# Patient Record
Sex: MALE | Race: WHITE | NOT HISPANIC OR LATINO | Employment: FULL TIME | URBAN - METROPOLITAN AREA
[De-identification: names, ages, dates, MRNs, and addresses within clinical notes are randomized per-mention and may not be internally consistent; named-entity substitution may affect disease eponyms.]

---

## 2024-02-05 ENCOUNTER — OFFICE VISIT (OUTPATIENT)
Dept: UROLOGY | Facility: CLINIC | Age: 39
End: 2024-02-05
Payer: COMMERCIAL

## 2024-02-05 VITALS — TEMPERATURE: 97.3 F | SYSTOLIC BLOOD PRESSURE: 118 MMHG | DIASTOLIC BLOOD PRESSURE: 74 MMHG | HEART RATE: 56 BPM

## 2024-02-05 DIAGNOSIS — Z30.09 VASECTOMY EVALUATION: Primary | ICD-10-CM

## 2024-02-05 PROCEDURE — 1036F TOBACCO NON-USER: CPT | Performed by: UROLOGY

## 2024-02-05 PROCEDURE — 99203 OFFICE O/P NEW LOW 30 MIN: CPT | Performed by: UROLOGY

## 2024-03-15 ENCOUNTER — APPOINTMENT (OUTPATIENT)
Dept: UROLOGY | Facility: CLINIC | Age: 39
End: 2024-03-15
Payer: COMMERCIAL

## 2024-05-10 ENCOUNTER — PROCEDURE VISIT (OUTPATIENT)
Dept: UROLOGY | Facility: CLINIC | Age: 39
End: 2024-05-10
Payer: COMMERCIAL

## 2024-05-10 VITALS
BODY MASS INDEX: 24.62 KG/M2 | SYSTOLIC BLOOD PRESSURE: 111 MMHG | HEART RATE: 63 BPM | DIASTOLIC BLOOD PRESSURE: 74 MMHG | HEIGHT: 70 IN | WEIGHT: 172 LBS

## 2024-05-10 DIAGNOSIS — Z30.2 ENCOUNTER FOR VASECTOMY: ICD-10-CM

## 2024-05-10 PROCEDURE — 55250 REMOVAL OF SPERM DUCT(S): CPT | Performed by: UROLOGY

## 2024-05-10 NOTE — PROGRESS NOTES
PRIOR NOTES  Healthy 38-year-old male previously seen and counseled by me on vasectomy who is here for his procedure    UPDATED SUBJECTIVE HISTORY  05/10/24 -no recent change in his health    Past Medical History  He has no past medical history on file.    Surgical History  He has no past surgical history on file.     Social History  He reports that he has never smoked. He has never used smokeless tobacco. He reports that he does not drink alcohol and does not use drugs.    Family History  No family history on file.     Allergies  Patient has no known allergies.    ROS: 12 system review was completed and is negative with the exception of those signs and symptoms noted in the history of present illness: A 12 system review was completed and is negative with the exception of those signs and symptoms noted in the history of present illness.     Exam:  General: in NAD, appears stated age  Head: normocephalic, atraumatic  Respiratory: normal effort, no use of accessory muscles  Cardiovascular: no edema noted  Skin: normal turgor, no rashes  Neurologic: grossly intact, oriented to person/place/time  Psychiatric: mode and affect appropriate    Patient ID: Jimmy Caraballo is a 38 y.o. male.    Vasectomy    Date/Time: 5/10/2024 3:38 PM    Performed by: Jimmy Naylor MD  Authorized by: Jimmy Naylor MD    Additional Details:      Informed consent was obtained    Patient was placed in the supine position. His genitalia were prepped and draped in the usual sterile fashion. A 5 mL of 1% lidocaine were used to anesthetize a small area in the right hemiscrotum. A small stab incision was made and the vas deferens was identified. The vas deferens was then delivered through the incision. A 1-2 cm segment was isolated and removed. The ends were tied with 4-0 silk suture and the lumen cauterized. There was good hemostasis. The 2 ends were placed back in the right hemiscrotum and the skin was closed with a single interrupted 3-0 chromic  "stitch. The exact same procedure was repeated on the left side. The patient tolerated the procedure well.           Last Recorded Vitals  Blood pressure 111/74, pulse 63, height 1.778 m (5' 10\"), weight 78 kg (172 lb).    No results found for: \"PSASCREEN\", \"PSAR\", \"KSCOR\", \"CREATININE\", \"HGB\"      ASSESSMENT/PLAN:  38-year-old male s/p uncomplicated vasectomy  - semen analysis 2 mo  - advised to take it easy over 2-3 days post-procedurally  - no intercourse or heavy lifting x 1 week  - ice, tylenol, ibuprofen prn  - 10-20 ejaculations between now and semen test  - contraception until he hears from us that he is sterile    Jimmy Naylor MD    "

## 2024-07-31 ENCOUNTER — APPOINTMENT (OUTPATIENT)
Dept: PRIMARY CARE | Facility: CLINIC | Age: 39
End: 2024-07-31
Payer: COMMERCIAL

## 2024-07-31 VITALS
DIASTOLIC BLOOD PRESSURE: 74 MMHG | SYSTOLIC BLOOD PRESSURE: 114 MMHG | OXYGEN SATURATION: 98 % | RESPIRATION RATE: 16 BRPM | TEMPERATURE: 98.6 F | HEIGHT: 70 IN | HEART RATE: 64 BPM | WEIGHT: 177 LBS | BODY MASS INDEX: 25.34 KG/M2

## 2024-07-31 DIAGNOSIS — R61 HYPERHIDROSIS: ICD-10-CM

## 2024-07-31 DIAGNOSIS — Z00.00 HEALTHCARE MAINTENANCE: Primary | ICD-10-CM

## 2024-07-31 DIAGNOSIS — F17.290 OTHER TOBACCO PRODUCT NICOTINE DEPENDENCE, UNCOMPLICATED: ICD-10-CM

## 2024-07-31 LAB
POC APPEARANCE, URINE: CLEAR
POC BILIRUBIN, URINE: NEGATIVE
POC BLOOD, URINE: NEGATIVE
POC COLOR, URINE: YELLOW
POC GLUCOSE, URINE: NEGATIVE MG/DL
POC KETONES, URINE: NEGATIVE MG/DL
POC LEUKOCYTES, URINE: NEGATIVE
POC NITRITE,URINE: NEGATIVE
POC PH, URINE: 5 PH
POC PROTEIN, URINE: NEGATIVE MG/DL
POC SPECIFIC GRAVITY, URINE: 1.02
POC UROBILINOGEN, URINE: 0.2 EU/DL

## 2024-07-31 PROCEDURE — 90471 IMMUNIZATION ADMIN: CPT | Performed by: FAMILY MEDICINE

## 2024-07-31 PROCEDURE — 90715 TDAP VACCINE 7 YRS/> IM: CPT | Performed by: FAMILY MEDICINE

## 2024-07-31 PROCEDURE — 99385 PREV VISIT NEW AGE 18-39: CPT | Performed by: FAMILY MEDICINE

## 2024-07-31 PROCEDURE — 81002 URINALYSIS NONAUTO W/O SCOPE: CPT | Performed by: FAMILY MEDICINE

## 2024-07-31 PROCEDURE — 3008F BODY MASS INDEX DOCD: CPT | Performed by: FAMILY MEDICINE

## 2024-07-31 RX ORDER — ALUMINUM CHLORIDE 20 %
SOLUTION, NON-ORAL TOPICAL NIGHTLY
Qty: 35 ML | Refills: 0 | Status: SHIPPED | OUTPATIENT
Start: 2024-07-31

## 2024-07-31 ASSESSMENT — ENCOUNTER SYMPTOMS
SHORTNESS OF BREATH: 0
HEADACHES: 0

## 2024-07-31 ASSESSMENT — PATIENT HEALTH QUESTIONNAIRE - PHQ9
2. FEELING DOWN, DEPRESSED OR HOPELESS: NOT AT ALL
SUM OF ALL RESPONSES TO PHQ9 QUESTIONS 1 AND 2: 0
1. LITTLE INTEREST OR PLEASURE IN DOING THINGS: NOT AT ALL

## 2024-07-31 NOTE — PROGRESS NOTES
"Subjective     Jimmy Caraballo is a 39 y.o. male who presents for Annual Exam (Establish care ).    HPI     Pt is new to me.  He is here to establish care and get his annual well exam. He reports increased sweating in hands and feet.  He has tried an OTC topical treatment which has not helped.     Review of Systems   Respiratory:  Negative for shortness of breath.    Cardiovascular:  Negative for chest pain.   Neurological:  Negative for headaches.       Objective     Vitals:    24 1346   BP: 114/74   Pulse: 64   Resp: 16   Temp: 37 °C (98.6 °F)   SpO2: 98%   Weight: 80.3 kg (177 lb)   Height: 1.778 m (5' 10\")        Current Outpatient Medications   Medication Instructions    aluminum chloride (Drysol) 20 % external solution Topical, Nightly        No Known Allergies     Past Surgical History:   Procedure Laterality Date    VASECTOMY  05/10/24        Social History     Tobacco Use    Smoking status: Former     Current packs/day: 0.00     Types: Cigarettes     Quit date: 2024     Years since quittin.1    Smokeless tobacco: Never    Tobacco comments:     Smoked on and off for many years. Currently vaping   Vaping Use    Vaping status: Every Day   Substance Use Topics    Alcohol use: Yes     Alcohol/week: 1.0 - 6.0 standard drink of alcohol     Types: 1 - 3 Cans of beer per week     Comment: Currently trying to quit    Drug use: Never        Social History     Substance and Sexual Activity   Alcohol Use Yes    Alcohol/week: 1.0 - 6.0 standard drink of alcohol    Types: 1 - 3 Cans of beer per week    Comment: Currently trying to quit       Family History   Problem Relation Name Age of Onset    No Known Problems Mother      Alcohol abuse Father Geoff caraballo     Alcohol abuse Sister Brigette caraballo     Cancer Brother Ramu caraballo         Immunization History   Administered Date(s) Administered    Hepatitis B vaccine, 19 yrs and under (RECOMBIVAX, ENGERIX) 10/29/1998    MMR vaccine, subcutaneous (MMR II) " 10/29/1998    Pfizer Purple Cap SARS-CoV-2 04/03/2021, 04/24/2021    Tdap vaccine, age 7 year and older (BOOSTRIX, ADACEL) 07/31/2024        Physical Exam  Vitals reviewed.   Constitutional:       General: He is not in acute distress.     Appearance: Normal appearance.   HENT:      Head: Normocephalic and atraumatic.      Right Ear: Tympanic membrane, ear canal and external ear normal.      Left Ear: Tympanic membrane, ear canal and external ear normal.      Nose: Nose normal.      Mouth/Throat:      Mouth: Mucous membranes are moist.      Pharynx: Oropharynx is clear. No oropharyngeal exudate or posterior oropharyngeal erythema.   Eyes:      Extraocular Movements: Extraocular movements intact.      Pupils: Pupils are equal, round, and reactive to light.   Neck:      Thyroid: No thyroid mass or thyromegaly.   Cardiovascular:      Rate and Rhythm: Normal rate and regular rhythm.      Heart sounds: No murmur heard.  Pulmonary:      Effort: Pulmonary effort is normal. No respiratory distress.      Breath sounds: Normal breath sounds. No wheezing, rhonchi or rales.   Abdominal:      General: Abdomen is flat. There is no distension.      Palpations: Abdomen is soft.      Tenderness: There is no abdominal tenderness.   Genitourinary:     Testes: Normal.   Musculoskeletal:         General: Normal range of motion.   Lymphadenopathy:      Cervical: No cervical adenopathy.   Skin:     General: Skin is warm and dry.      Findings: No rash.   Neurological:      General: No focal deficit present.      Mental Status: He is alert and oriented to person, place, and time. Mental status is at baseline.   Psychiatric:         Mood and Affect: Mood normal.         Behavior: Behavior normal.         Problem List Items Addressed This Visit    None  Visit Diagnoses       Healthcare maintenance    -  Primary    Relevant Orders    POCT UA (nonautomated) manually resulted (Completed)    Tdap vaccine, age 7 years and older (Completed)     Comprehensive Metabolic Panel    Lipid Panel    CBC and Auto Differential    Hemoglobin A1C    Other tobacco product nicotine dependence, uncomplicated        Hyperhidrosis        Relevant Medications    aluminum chloride (Drysol) 20 % external solution            Assessment/Plan     Well Exam - new patient    Vaccines - tdap given today    I recommend yearly flu vaccine and routine COVID vaccinations as indicated     Complete labs    Healthy diet, routine exercise was discussed with patient      Nicotine use - patient is aware of the health risks associated with nicotine/smoking use.  Pt aware of the need to quit smoking/using nicotine.  Patient was counseled on smoking cessation, treatment options discussed today with patient.   Vapes. Former cigarette smoker. Pt will consider nicotine patches OTC to help him quit     Hyperhydrosis - hands/feet - drysol solution prescribed.     Follow up in 1 year or sooner if needed